# Patient Record
(demographics unavailable — no encounter records)

---

## 2025-02-04 NOTE — DISCUSSION/SUMMARY
[FreeTextEntry1] : Doing  well  exam   normal  mild  dermatitis   mom  has  appointment   with  derm